# Patient Record
Sex: FEMALE | Race: WHITE | NOT HISPANIC OR LATINO | Employment: OTHER | URBAN - NONMETROPOLITAN AREA
[De-identification: names, ages, dates, MRNs, and addresses within clinical notes are randomized per-mention and may not be internally consistent; named-entity substitution may affect disease eponyms.]

---

## 2017-02-01 ENCOUNTER — TRANSCRIBE ORDERS (OUTPATIENT)
Dept: ADMINISTRATIVE | Facility: HOSPITAL | Age: 52
End: 2017-02-01

## 2017-02-01 DIAGNOSIS — G40.901 FEBRILE SEIZURE WITH STATUS EPILEPTICUS (HCC): Primary | ICD-10-CM

## 2017-02-14 ENCOUNTER — HOSPITAL ENCOUNTER (OUTPATIENT)
Dept: ULTRASOUND IMAGING | Facility: HOSPITAL | Age: 52
Discharge: HOME | End: 2017-02-14

## 2017-02-14 ENCOUNTER — HOSPITAL ENCOUNTER (OUTPATIENT)
Dept: MRI IMAGING | Facility: HOSPITAL | Age: 52
Discharge: HOME | End: 2017-02-14
Admitting: NURSE PRACTITIONER

## 2017-02-14 DIAGNOSIS — G40.901 FEBRILE SEIZURE WITH STATUS EPILEPTICUS (HCC): ICD-10-CM

## 2017-02-14 LAB — CREAT BLDA-MCNC: 1.1 MG/DL (ref 0.6–1.3)

## 2017-02-14 PROCEDURE — 93880 EXTRACRANIAL BILAT STUDY: CPT | Performed by: SURGERY

## 2017-02-14 PROCEDURE — 70553 MRI BRAIN STEM W/O & W/DYE: CPT

## 2017-02-14 PROCEDURE — 0 GADOBENATE DIMEGLUMINE 529 MG/ML SOLUTION: Performed by: NURSE PRACTITIONER

## 2017-02-14 PROCEDURE — 82565 ASSAY OF CREATININE: CPT

## 2017-02-14 PROCEDURE — A9577 INJ MULTIHANCE: HCPCS | Performed by: NURSE PRACTITIONER

## 2017-02-14 PROCEDURE — 93880 EXTRACRANIAL BILAT STUDY: CPT

## 2017-02-14 RX ADMIN — GADOBENATE DIMEGLUMINE 20 ML: 529 INJECTION, SOLUTION INTRAVENOUS at 12:30

## 2018-04-10 RX ORDER — ZONISAMIDE 100 MG/1
100 CAPSULE ORAL
COMMUNITY
End: 2018-07-12 | Stop reason: SDDI

## 2018-04-10 RX ORDER — ENALAPRIL MALEATE 20 MG/1
20 TABLET ORAL 2 TIMES DAILY
COMMUNITY

## 2018-04-10 RX ORDER — ESCITALOPRAM OXALATE 20 MG/1
20 TABLET ORAL DAILY
COMMUNITY
End: 2019-12-16 | Stop reason: ALTCHOICE

## 2018-04-10 RX ORDER — ERGOCALCIFEROL 1.25 MG/1
50000 CAPSULE ORAL WEEKLY
COMMUNITY
End: 2021-06-21 | Stop reason: SDUPTHER

## 2018-04-10 RX ORDER — ASPIRIN 325 MG
325 TABLET ORAL DAILY
COMMUNITY
End: 2018-07-12

## 2018-04-10 RX ORDER — TRIAMTERENE AND HYDROCHLOROTHIAZIDE 37.5; 25 MG/1; MG/1
1 CAPSULE ORAL EVERY MORNING
COMMUNITY
End: 2021-12-14

## 2018-04-10 RX ORDER — ATORVASTATIN CALCIUM 10 MG/1
10 TABLET, FILM COATED ORAL DAILY
COMMUNITY
End: 2018-07-12

## 2018-04-10 RX ORDER — AMLODIPINE BESYLATE 10 MG/1
10 TABLET ORAL DAILY
COMMUNITY

## 2018-04-10 RX ORDER — OMEPRAZOLE 40 MG/1
40 CAPSULE, DELAYED RELEASE ORAL DAILY
COMMUNITY

## 2018-06-07 ENCOUNTER — OFFICE VISIT (OUTPATIENT)
Dept: NEUROLOGY | Facility: CLINIC | Age: 53
End: 2018-06-07

## 2018-06-07 VITALS
WEIGHT: 253 LBS | SYSTOLIC BLOOD PRESSURE: 138 MMHG | HEART RATE: 78 BPM | RESPIRATION RATE: 18 BRPM | DIASTOLIC BLOOD PRESSURE: 80 MMHG | HEIGHT: 67 IN | BODY MASS INDEX: 39.71 KG/M2

## 2018-06-07 DIAGNOSIS — R41.3 MEMORY DIFFICULTY: Primary | ICD-10-CM

## 2018-06-07 DIAGNOSIS — R06.83 SNORING: ICD-10-CM

## 2018-06-07 PROCEDURE — 99204 OFFICE O/P NEW MOD 45 MIN: CPT | Performed by: PSYCHIATRY & NEUROLOGY

## 2018-06-07 RX ORDER — ASPIRIN 325 MG
325 TABLET ORAL DAILY
COMMUNITY

## 2018-06-07 RX ORDER — TRIAMTERENE AND HYDROCHLOROTHIAZIDE 37.5; 25 MG/1; MG/1
1 CAPSULE ORAL EVERY MORNING
COMMUNITY
End: 2018-07-12

## 2018-06-07 RX ORDER — OMEPRAZOLE 40 MG/1
40 CAPSULE, DELAYED RELEASE ORAL DAILY
COMMUNITY
End: 2018-07-12

## 2018-06-07 RX ORDER — CITALOPRAM 20 MG/1
20 TABLET ORAL DAILY
COMMUNITY
End: 2018-08-27 | Stop reason: ALTCHOICE

## 2018-06-07 RX ORDER — ENALAPRIL MALEATE 20 MG/1
20 TABLET ORAL 2 TIMES DAILY
COMMUNITY
End: 2018-07-12

## 2018-06-07 RX ORDER — ERGOCALCIFEROL 1.25 MG/1
50000 CAPSULE ORAL WEEKLY
COMMUNITY
End: 2018-07-12

## 2018-06-07 RX ORDER — ATORVASTATIN CALCIUM 40 MG/1
40 TABLET, FILM COATED ORAL DAILY
COMMUNITY

## 2018-06-07 RX ORDER — AMLODIPINE BESYLATE 2.5 MG/1
2.5 TABLET ORAL DAILY
COMMUNITY
End: 2018-07-12
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
## 2018-06-07 NOTE — PATIENT INSTRUCTIONS
Patient not to be driving.  Patient safety precautions: Patient not to be climbing or working at heights or working with sharp cutting tools or working over hot fires or water.  Patient to take showers not baths and not to be swimming by self or in hot tub by self.  Patient to get back with PCP about weight and diet control

## 2018-06-14 DIAGNOSIS — R06.83 SNORING: ICD-10-CM

## 2018-06-27 ENCOUNTER — HOSPITAL ENCOUNTER (OUTPATIENT)
Dept: SPEECH THERAPY | Facility: HOSPITAL | Age: 53
Setting detail: THERAPIES SERIES
Discharge: HOME | End: 2018-06-27

## 2018-06-27 ENCOUNTER — APPOINTMENT (OUTPATIENT)
Dept: SPEECH THERAPY | Facility: HOSPITAL | Age: 53
End: 2018-06-27

## 2018-06-27 ENCOUNTER — LAB (OUTPATIENT)
Dept: LAB | Facility: HOSPITAL | Age: 53
End: 2018-06-27
Attending: PSYCHIATRY & NEUROLOGY

## 2018-06-27 ENCOUNTER — HOSPITAL ENCOUNTER (OUTPATIENT)
Dept: NEUROLOGY | Facility: HOSPITAL | Age: 53
Discharge: HOME | End: 2018-06-27
Attending: PSYCHIATRY & NEUROLOGY | Admitting: PSYCHIATRY & NEUROLOGY

## 2018-06-27 DIAGNOSIS — R41.3 MEMORY DIFFICULTY: ICD-10-CM

## 2018-06-27 DIAGNOSIS — R41.3 MEMORY CHANGE: Primary | ICD-10-CM

## 2018-06-27 LAB
ALBUMIN SERPL-MCNC: 4.4 G/DL (ref 3.5–5)
ALBUMIN/GLOB SERPL: 1.3 G/DL (ref 1.1–2.5)
ALP SERPL-CCNC: 129 U/L (ref 24–120)
ALT SERPL W P-5'-P-CCNC: 28 U/L (ref 0–54)
ANION GAP SERPL CALCULATED.3IONS-SCNC: 13 MMOL/L (ref 4–13)
ARTICHOKE IGE QN: 127 MG/DL (ref 0–99)
AST SERPL-CCNC: 27 U/L (ref 7–45)
BASOPHILS # BLD AUTO: 0.04 10*3/MM3 (ref 0–0.2)
BASOPHILS NFR BLD AUTO: 0.3 % (ref 0–2)
BILIRUB SERPL-MCNC: 0.4 MG/DL (ref 0.1–1)
BUN BLD-MCNC: 18 MG/DL (ref 5–21)
BUN/CREAT SERPL: 17.1 (ref 7–25)
CALCIUM SPEC-SCNC: 10.3 MG/DL (ref 8.4–10.4)
CHLORIDE SERPL-SCNC: 103 MMOL/L (ref 98–110)
CHOLEST SERPL-MCNC: 224 MG/DL (ref 130–200)
CO2 SERPL-SCNC: 27 MMOL/L (ref 24–31)
CREAT BLD-MCNC: 1.05 MG/DL (ref 0.5–1.4)
DEPRECATED RDW RBC AUTO: 48 FL (ref 40–54)
EOSINOPHIL # BLD AUTO: 0.29 10*3/MM3 (ref 0–0.7)
EOSINOPHIL NFR BLD AUTO: 2.2 % (ref 0–4)
ERYTHROCYTE [DISTWIDTH] IN BLOOD BY AUTOMATED COUNT: 14.2 % (ref 12–15)
ERYTHROCYTE [SEDIMENTATION RATE] IN BLOOD: 19 MM/HR (ref 0–20)
FOLATE SERPL-MCNC: >20 NG/ML
GFR SERPL CREATININE-BSD FRML MDRD: 55 ML/MIN/1.73
GLOBULIN UR ELPH-MCNC: 3.4 GM/DL
GLUCOSE BLD-MCNC: 109 MG/DL (ref 70–100)
HCT VFR BLD AUTO: 41.4 % (ref 37–47)
HDLC SERPL-MCNC: 38 MG/DL
HGB BLD-MCNC: 13.4 G/DL (ref 12–16)
IMM GRANULOCYTES # BLD: 0.09 10*3/MM3 (ref 0–0.03)
IMM GRANULOCYTES NFR BLD: 0.7 % (ref 0–5)
LDLC/HDLC SERPL: 3.44 {RATIO}
LYMPHOCYTES # BLD AUTO: 2.7 10*3/MM3 (ref 0.72–4.86)
LYMPHOCYTES NFR BLD AUTO: 20.8 % (ref 15–45)
MAGNESIUM SERPL-MCNC: 1.8 MG/DL (ref 1.4–2.2)
MCH RBC QN AUTO: 29.9 PG (ref 28–32)
MCHC RBC AUTO-ENTMCNC: 32.4 G/DL (ref 33–36)
MCV RBC AUTO: 92.4 FL (ref 82–98)
MONOCYTES # BLD AUTO: 0.73 10*3/MM3 (ref 0.19–1.3)
MONOCYTES NFR BLD AUTO: 5.6 % (ref 4–12)
NEUTROPHILS # BLD AUTO: 9.1 10*3/MM3 (ref 1.87–8.4)
NEUTROPHILS NFR BLD AUTO: 70.4 % (ref 39–78)
NRBC BLD MANUAL-RTO: 0 /100 WBC (ref 0–0)
PLATELET # BLD AUTO: 302 10*3/MM3 (ref 130–400)
PMV BLD AUTO: 10.3 FL (ref 6–12)
POTASSIUM BLD-SCNC: 5.2 MMOL/L (ref 3.5–5.3)
PROT SERPL-MCNC: 7.8 G/DL (ref 6.3–8.7)
RBC # BLD AUTO: 4.48 10*6/MM3 (ref 4.2–5.4)
SODIUM BLD-SCNC: 143 MMOL/L (ref 135–145)
T4 FREE SERPL-MCNC: 1.12 NG/DL (ref 0.78–2.19)
TRIGL SERPL-MCNC: 277 MG/DL (ref 0–149)
VIT B12 BLD-MCNC: 309 PG/ML (ref 239–931)
WBC NRBC COR # BLD: 12.95 10*3/MM3 (ref 4.8–10.8)

## 2018-06-27 PROCEDURE — 84439 ASSAY OF FREE THYROXINE: CPT | Performed by: PSYCHIATRY & NEUROLOGY

## 2018-06-27 PROCEDURE — 95816 EEG AWAKE AND DROWSY: CPT | Performed by: PSYCHIATRY & NEUROLOGY

## 2018-06-27 PROCEDURE — G9169 MEMORY GOAL STATUS: HCPCS | Performed by: SPEECH-LANGUAGE PATHOLOGIST

## 2018-06-27 PROCEDURE — 82607 VITAMIN B-12: CPT | Performed by: PSYCHIATRY & NEUROLOGY

## 2018-06-27 PROCEDURE — 85651 RBC SED RATE NONAUTOMATED: CPT | Performed by: PSYCHIATRY & NEUROLOGY

## 2018-06-27 PROCEDURE — 95816 EEG AWAKE AND DROWSY: CPT

## 2018-06-27 PROCEDURE — 80061 LIPID PANEL: CPT | Performed by: PSYCHIATRY & NEUROLOGY

## 2018-06-27 PROCEDURE — 85025 COMPLETE CBC W/AUTO DIFF WBC: CPT | Performed by: PSYCHIATRY & NEUROLOGY

## 2018-06-27 PROCEDURE — 36415 COLL VENOUS BLD VENIPUNCTURE: CPT

## 2018-06-27 PROCEDURE — 96125 COGNITIVE TEST BY HC PRO: CPT | Performed by: SPEECH-LANGUAGE PATHOLOGIST

## 2018-06-27 PROCEDURE — G9168 MEMORY CURRENT STATUS: HCPCS | Performed by: SPEECH-LANGUAGE PATHOLOGIST

## 2018-06-27 PROCEDURE — 80053 COMPREHEN METABOLIC PANEL: CPT | Performed by: PSYCHIATRY & NEUROLOGY

## 2018-06-27 PROCEDURE — 83735 ASSAY OF MAGNESIUM: CPT | Performed by: PSYCHIATRY & NEUROLOGY

## 2018-06-27 PROCEDURE — 82746 ASSAY OF FOLIC ACID SERUM: CPT | Performed by: PSYCHIATRY & NEUROLOGY
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
## 2018-06-28 ENCOUNTER — HOSPITAL ENCOUNTER (OUTPATIENT)
Dept: MRI IMAGING | Facility: HOSPITAL | Age: 53
Discharge: HOME | End: 2018-06-28
Attending: PSYCHIATRY & NEUROLOGY | Admitting: PSYCHIATRY & NEUROLOGY

## 2018-06-28 ENCOUNTER — HOSPITAL ENCOUNTER (OUTPATIENT)
Dept: ULTRASOUND IMAGING | Facility: HOSPITAL | Age: 53
Discharge: HOME | End: 2018-06-28
Attending: PSYCHIATRY & NEUROLOGY

## 2018-06-28 DIAGNOSIS — R41.3 MEMORY DIFFICULTY: ICD-10-CM

## 2018-06-28 PROCEDURE — 70551 MRI BRAIN STEM W/O DYE: CPT

## 2018-06-28 PROCEDURE — 93880 EXTRACRANIAL BILAT STUDY: CPT | Performed by: SURGERY

## 2018-06-28 PROCEDURE — 93880 EXTRACRANIAL BILAT STUDY: CPT

## 2018-07-02 ENCOUNTER — APPOINTMENT (OUTPATIENT)
Dept: SPEECH THERAPY | Facility: HOSPITAL | Age: 53
End: 2018-07-02

## 2018-07-12 ENCOUNTER — OFFICE VISIT (OUTPATIENT)
Dept: NEUROLOGY | Facility: CLINIC | Age: 53
End: 2018-07-12

## 2018-07-12 VITALS
WEIGHT: 257 LBS | DIASTOLIC BLOOD PRESSURE: 80 MMHG | RESPIRATION RATE: 18 BRPM | HEIGHT: 67 IN | BODY MASS INDEX: 40.34 KG/M2 | SYSTOLIC BLOOD PRESSURE: 128 MMHG | HEART RATE: 78 BPM

## 2018-07-12 DIAGNOSIS — R90.89 ABNORMAL BRAIN MRI: ICD-10-CM

## 2018-07-12 DIAGNOSIS — R41.3 MEMORY DIFFICULTY: Primary | ICD-10-CM

## 2018-07-12 PROCEDURE — 99214 OFFICE O/P EST MOD 30 MIN: CPT | Performed by: PSYCHIATRY & NEUROLOGY

## 2018-07-12 RX ORDER — MEMANTINE HYDROCHLORIDE 5 MG/1
TABLET ORAL
Qty: 60 TABLET | Refills: 2 | Status: SHIPPED | OUTPATIENT
Start: 2018-07-12 | End: 2018-08-27

## 2018-07-12 NOTE — PROGRESS NOTES
Subjective   Jammie Mims, 1965, is a female who is being seen today for   Chief Complaint   Patient presents with   • Memory Loss   • Stroke       HISTORY OF PRESENT ILLNESS: Extended follow-up.  Patient MMSE today is 29 of 30.  Patient has had formal memory test that shows 40-60% impairment.  Patient MRI brain shows no significant acute changes but chronic bilateral cerebellar infarcts.  Patient's MRA brain was denied.  Patient's overnight continues oximetry showed no major abnormalities.  Patient's carotid ultrasound shows less than 50% stenosis internal carotids and antegrade vertebrals.  There is greater than 50% stenosis of the right external carotid artery to the face.  EEG was noncontributory.    REVIEW OF SYSTEMS:   GENERAL: As above  PULMONARY: As above  CVS:  No acute chest pain or palpitation  GASTROINTESTINAL: No acute GI distress  GENITOURINARY: No acute  distress  GYN: Status post hysterectomy  MUSCULOSKELETAL: No acute musculoskeletal symptoms  HEENT:  No acute vision or hearing change.  Patient denies any significant headaches  ENDOCRINE:  No acute endocrine symptoms  PSYCHIATRIC: No acute psychiatric symptoms  HEMATOLOGY: As above  SKIN: No acute changes  Family history positive for dementia in her father  Social history: Patient does smoke.  Patient denies alcohol or drug use    PHYSICAL EXAMINATION:    GENERAL: No acute distress  CRANIUM: Normocephalic/atraumatic  HEENT: No acute fundic abnormalities.  Pupils equal round reactive to light.       EYES: EOMs intact without nystagmus and fields full to confrontation       EARS:  Tympanic membranes normal hears tuning fork bilaterally       THROAT: No oropharynx abnormalities       NECK:  No bruits/no lymphadenopathy  CHEST: No acute cardiopulmonary abnormalities by auscultation  ABDOMEN: Nondistended  EXTREMITIES: Pulses symmetrical  NEURO: Patient alert and follows commands without difficulty  SPEECH:  Normal    CRANIAL NERVES:   Motor sensory about the face normal and symmetric    MOTOR STRENGTH:  Motor strength upper and lower extremities normal  STATION AND GAIT:  Gait normal/Romberg negative  CEREBELLAR:  Finger-nose and heel shin normal  SENSORY:  Pin and vibration upper and lower extremities normal    REFLEXES:  Reflexes slightly hyperactive upper and lower extremities without Babinski's or clonus      ASSESSMENT AND PLAN:  Patient with memory difficulties as above and abnormal brain MRI.  We are going to try again to see of we get the MRA brain approved.  Her setting up speech therapy locally and [facility] for follow-up of her memory and starting the patient on Namenda 5 mg by mouth daily warning in detail of side effects.  Patient is to increase to 5 mg by mouth twice a day as tolerated in 2 weeks.  Patient not to be driving and safety precautions reviewed.Patient's Body mass index is 40.25 kg/m². BMI is above normal parameters. Recommendations include: referral to primary care.  I spent 25 minutes with this patient with 15 minutes counseling.      Jammie was seen today for memory loss and stroke.    Diagnoses and all orders for this visit:    Memory difficulty  -     Ambulatory Referral to Speech Therapy    Abnormal brain MRI  -     MRI Angiogram Head Without Contrast; Future    Other orders  -     memantine (NAMENDA) 5 MG tablet; One by mouth daily ×2 weeks and if tolerated increase to 1 by mouth twice a day

## 2018-07-12 NOTE — PATIENT INSTRUCTIONS
Patient not to be driving until released.  Patient not to be climbing or working at heights or working over hot fires or water or with sharp cutting tools.  Patient to get with PCP about weight and diet control and elevated cholesterol/triglycerides and elevated white blood cell count.  Patient to get with PCP on weight and diet control

## 2018-07-18 RX ORDER — DONEPEZIL HYDROCHLORIDE 5 MG/1
5 TABLET, FILM COATED ORAL NIGHTLY
Qty: 90 TABLET | Refills: 0 | Status: SHIPPED | OUTPATIENT
Start: 2018-07-18 | End: 2018-10-19 | Stop reason: SINTOL

## 2018-07-20 ENCOUNTER — DOCUMENTATION (OUTPATIENT)
Dept: NEUROLOGY | Facility: CLINIC | Age: 53
End: 2018-07-20

## 2018-07-20 ENCOUNTER — TELEPHONE (OUTPATIENT)
Dept: NEUROLOGY | Facility: CLINIC | Age: 53
End: 2018-07-20

## 2018-07-20 NOTE — PROGRESS NOTES
I have left a message for patient to contact our office in regards to the Aricept.  I have notified the phone nurse of Dr. Barry orders and she is to speak with patient about his instructions.

## 2018-07-20 NOTE — TELEPHONE ENCOUNTER
Jammie notified Dr. Barry wants her to talk to her PCP about discontinuing the Citalopram.  She said she will try to contact her today, she is also to ask her to contact Dr. Barry.  Aricept has been discontinued due to an interaction between the two medications.

## 2018-07-25 ENCOUNTER — DOCUMENTATION (OUTPATIENT)
Dept: NEUROLOGY | Facility: CLINIC | Age: 53
End: 2018-07-25

## 2018-07-25 NOTE — PROGRESS NOTES
I contacted Carmelita Matthews nurse practitioner at [phone] and she is going to discuss taking the patient off of citalopram and starting the Aricept.  If the patient tolerates discontinuing citalopram she is to start Aricept 5 mg by mouth daily again warning of potential side effects

## 2018-07-26 ENCOUNTER — HOSPITAL ENCOUNTER (OUTPATIENT)
Dept: MRI IMAGING | Facility: HOSPITAL | Age: 53
Discharge: HOME | End: 2018-07-26
Attending: PSYCHIATRY & NEUROLOGY | Admitting: PSYCHIATRY & NEUROLOGY

## 2018-07-26 DIAGNOSIS — R90.89 ABNORMAL BRAIN MRI: ICD-10-CM

## 2018-07-26 PROCEDURE — 70544 MR ANGIOGRAPHY HEAD W/O DYE: CPT

## 2018-08-27 ENCOUNTER — OFFICE VISIT (OUTPATIENT)
Dept: NEUROLOGY | Facility: CLINIC | Age: 53
End: 2018-08-27

## 2018-08-27 VITALS
HEART RATE: 74 BPM | WEIGHT: 251 LBS | HEIGHT: 67 IN | RESPIRATION RATE: 18 BRPM | DIASTOLIC BLOOD PRESSURE: 80 MMHG | SYSTOLIC BLOOD PRESSURE: 140 MMHG | BODY MASS INDEX: 39.39 KG/M2

## 2018-08-27 DIAGNOSIS — R41.3 MEMORY DIFFICULTY: Primary | ICD-10-CM

## 2018-08-27 PROCEDURE — 99213 OFFICE O/P EST LOW 20 MIN: CPT | Performed by: PSYCHIATRY & NEUROLOGY

## 2018-08-27 NOTE — PATIENT INSTRUCTIONS
Patient to not drive for at least the first week after starting the Aricept.  If there are problems with the Aricept we are to be contacted immediately.  Patient to get with PCP about weight and diet control

## 2018-08-27 NOTE — PROGRESS NOTES
Subjective   Jammie Mims, 1965, is a female who is being seen today for   Chief Complaint   Patient presents with   • Memory Loss   • Stroke       HISTORY OF PRESENT ILLNESS: Patient seen for memory difficulty.  MMSE today is 30 of 30.  Patient now off the Celexa and can start the Aricept low dosage at 5 mg by mouth daily.  Side effects are discussed.  Patient is to not be driving for the at least the first week on the Aricept to make sure it does not cause side effects.  Patient could not afford the Namenda.  If there are side effects they are to let us know immediately.  Patient's MRA brain showed no abnormalities.  Patient to get outpatient follow-up for her memory difficulties.    REVIEW OF SYSTEMS:   GENERAL: As above  PULMONARY: No acute respiratory difficulties  CVS:  No acute chest pain or palpitation  GASTROINTESTINAL: No acute GI distress  GENITOURINARY: No acute  distress  GYN: Post hysterectomy  MUSCULOSKELETAL: No acute musculoskeletal symptoms  HEENT:  No acute vision or hearing change  ENDOCRINE:  No acute endocrine symptoms  PSYCHIATRIC: No acute psychiatric symptoms  HEMATOLOGY: No anemia.  Patient had slight elevation of white blood cell count is to get repeat CBC  SKIN: No acute skin changes  Family history reviewed and has history of dementia  Social history: Patient does smoke.  Patient denies alcohol or drug use    PHYSICAL EXAMINATION:    GENERAL: Patient blood pressure 140/80 sitting, 140/76 standing left arm  CRANIUM: Normocephalic/atraumatic/atraumatic  HEENT: No acute fundic abnormalities.  Pupils equal round reactive to light.       EYES: EOMs intact without nystagmus and fields full to confrontation       EARS:  Tympanic membranes normal hears tuning fork bilaterally       THROAT: No oropharynx abnormalities       NECK:  No bruits/no lymphadenopathy  CHEST: No acute cardiopulmonary abnormalities by auscultation  ABDOMEN: Nondistended  EXTREMITIES: Pulses  symmetrical  NEURO: Patient alert and follows commands without difficulty  SPEECH:  Normal    CRANIAL NERVES:  Motor sensory about the face normal and symmetric    MOTOR STRENGTH:  Motor strength upper and lower extremities normal  STATION AND GAIT:  Gait normal/Romberg negative  CEREBELLAR:  Finger-nose and heel shin normal  SENSORY:  Pin and vibration upper and lower extremities normal  REFLEXES:  Reflexes slightly hyperactive upper and lower extremity without Babinski's or clonus      ASSESSMENT AND PLAN:  Patient with memory difficulties.  To start Aricept. Patient's Body mass index is 39.31 kg/m². BMI is above normal parameters. Recommendations include: referral to primary care.      Jammie was seen today for memory loss and stroke.    Diagnoses and all orders for this visit:    Memory difficulty

## 2018-10-18 ENCOUNTER — TELEPHONE (OUTPATIENT)
Dept: NEUROLOGY | Facility: CLINIC | Age: 53
End: 2018-10-18

## 2018-10-18 NOTE — TELEPHONE ENCOUNTER
Jammie notified Dr. Barry wants her to contact her PCP about taking Celexa again.  She said she isn't taking Aricept and she didn't know Lexapro had been prescribed for her.  She said she has some Celexa left and wanted to know if she could start taking it again.  I told her she needs to contact her PCP and ask if she can start taking the Celexa.  She said her PCP can see her on Tuesday, but she will call and ask if she can restart the Celexa.

## 2018-10-19 RX ORDER — MEMANTINE HYDROCHLORIDE 5 MG/1
5 TABLET ORAL DAILY
Qty: 90 TABLET | Refills: 0 | Status: SHIPPED | OUTPATIENT
Start: 2018-10-19 | End: 2018-11-20 | Stop reason: SDUPTHER

## 2018-10-30 DIAGNOSIS — R41.3 MEMORY DIFFICULTY: ICD-10-CM

## 2018-11-02 ENCOUNTER — OFFICE VISIT (OUTPATIENT)
Dept: NEUROLOGY | Facility: CLINIC | Age: 53
End: 2018-11-02

## 2018-11-02 VITALS
HEIGHT: 67 IN | HEART RATE: 74 BPM | SYSTOLIC BLOOD PRESSURE: 120 MMHG | WEIGHT: 253 LBS | DIASTOLIC BLOOD PRESSURE: 80 MMHG | BODY MASS INDEX: 39.71 KG/M2 | RESPIRATION RATE: 18 BRPM

## 2018-11-02 DIAGNOSIS — R41.3 MEMORY DIFFICULTY: Primary | ICD-10-CM

## 2018-11-02 PROCEDURE — 99213 OFFICE O/P EST LOW 20 MIN: CPT | Performed by: PSYCHIATRY & NEUROLOGY

## 2018-11-02 NOTE — PATIENT INSTRUCTIONS
Patient/family are to contact our office in 2 weeks to let us know how she is doing on the low-dose Namenda and if stable may be able to increase doses to 5 mg by mouth twice a day.  Patient to not be driving for at least 5 days after increasing the dose/ to get with PCP about weight and diet control

## 2018-11-02 NOTE — PROGRESS NOTES
Subjective   Jammie Mims, 1965, is a female who is being seen today for   Chief Complaint   Patient presents with   • Memory Loss       HISTORY OF PRESENT ILLNESS: Patient seen for memory difficulty.  Patient had significant side effects from Aricept and discontinued that.  Patient now on Namenda 5 mg by mouth daily which she is tolerating well.  She has been on for 5 days.  Blood pressure today is 120/80 left arm sitting and 130/86 left arm standing.  Pulse 74.  REVIEW OF SYSTEMS:   GENERAL: MMSE is 30 of 30.  PULMONARY: No acute respiratory difficulties  CVS:  No chest pain or palpitation  GASTROINTESTINAL: No GI distress at this time  GENITOURINARY: No  distress  GYN: Post hysterectomy  MUSCULOSKELETAL: No acute musculoskeletal symptoms  HEENT:  No acute vision or hearing change  ENDOCRINE:  No acute endocrine symptoms  PSYCHIATRIC: No psychiatric symptoms at this time  HEMATOLOGY: No anemia  SKIN: No skin changes  Family history reviewed and otherwise noncontributory except for history of dementia  Social history: Patient does smoke.  Patient denies alcohol or drug use    PHYSICAL EXAMINATION:    GENERAL: No acute distress  CRANIUM: Normocephalic/atraumatic  HEENT: No acute fundic abnormalities.  Pupils equal round reactive to light.       EYES: EOMs intact without nystagmus and fields full to confrontation       EARS:  Tympanic membranes normal hears tuning fork bilaterally       THROAT: No oropharynx abnormalities       NECK:  No bruits/no lymphadenopathy  CHEST: No acute cardiopulmonary abnormalities by auscultation  ABDOMEN: Nondistended  EXTREMITIES: Pulses symmetrical  NEURO: Patient alert and follows commands without difficulty  SPEECH:  Normal    CRANIAL NERVES:  Motor sensory about the face normal and symmetric    MOTOR STRENGTH:  Motor strength upper and lower extremities normal  STATION AND GAIT:  Gait normal/Romberg negative  CEREBELLAR:  Finger-nose and heel shin  normal  SENSORY:  Pin and vibration upper and lower extremities normal  REFLEXES:  Reflexes slightly hyperactive upper and lower extremity without Babinski's or clonus      ASSESSMENT AND PLAN:  Patient with memory difficulties continuing Namenda and increasing dosage in 2 weeks if tolerated.  Patient not to be driving after increasing dosage. Patient's Body mass index is 39.63 kg/m². BMI is above normal parameters. Recommendations include: referral to primary care.      Jammie was seen today for memory loss.    Diagnoses and all orders for this visit:    Memory difficulty

## 2018-11-20 RX ORDER — MEMANTINE HYDROCHLORIDE 5 MG/1
5 TABLET ORAL 2 TIMES DAILY
Qty: 60 TABLET | Refills: 0 | Status: SHIPPED | OUTPATIENT
Start: 2018-11-20 | End: 2019-01-23 | Stop reason: SDUPTHER

## 2018-11-28 ENCOUNTER — TELEPHONE (OUTPATIENT)
Dept: NEUROLOGY | Facility: CLINIC | Age: 53
End: 2018-11-28

## 2018-11-28 NOTE — TELEPHONE ENCOUNTER
Jammie said the [facility]  She is going to contact [facility] and ask if she can return the medication.  [facility]

## 2018-12-10 ENCOUNTER — TELEPHONE (OUTPATIENT)
Dept: NEUROLOGY | Facility: CLINIC | Age: 53
End: 2018-12-10

## 2018-12-10 NOTE — TELEPHONE ENCOUNTER
I left a message for this patient to contact us/ she was to call our office back /that it was okay for her to go back on low-dose Celexa and I did speak as well with her nurse practitioner Carmelita Matthews.  The nurse practitioner agreed to contact the patient about this.

## 2018-12-10 NOTE — TELEPHONE ENCOUNTER
Jammie notified that she could restart Celexa.  She said her PCP office had called her and restarted the Celexa for her.

## 2019-01-23 RX ORDER — MEMANTINE HYDROCHLORIDE 5 MG/1
5 TABLET ORAL 2 TIMES DAILY
Qty: 60 TABLET | Refills: 5 | Status: SHIPPED | OUTPATIENT
Start: 2019-01-23 | End: 2019-05-07 | Stop reason: SDUPTHER

## 2019-04-04 ENCOUNTER — TELEPHONE (OUTPATIENT)
Dept: NEUROLOGY | Facility: CLINIC | Age: 54
End: 2019-04-04

## 2019-04-05 ENCOUNTER — OFFICE VISIT (OUTPATIENT)
Dept: NEUROLOGY | Facility: CLINIC | Age: 54
End: 2019-04-05

## 2019-04-05 VITALS
HEIGHT: 67 IN | BODY MASS INDEX: 39.71 KG/M2 | SYSTOLIC BLOOD PRESSURE: 126 MMHG | HEART RATE: 78 BPM | WEIGHT: 253 LBS | DIASTOLIC BLOOD PRESSURE: 70 MMHG

## 2019-04-05 DIAGNOSIS — R41.3 MEMORY DIFFICULTY: Primary | ICD-10-CM

## 2019-04-05 PROCEDURE — 99214 OFFICE O/P EST MOD 30 MIN: CPT | Performed by: PSYCHIATRY & NEUROLOGY

## 2019-04-05 NOTE — PROGRESS NOTES
Subjective   Jammie Mims, 1965, is a female who is being seen today for   Chief Complaint   Patient presents with   • Memory Loss     Patient states she has no change since last visit but feels really good. On a weight loss program.        HISTORY OF PRESENT ILLNESS: Extended follow-up.  Patient has tolerated the 5 mg 1 p.o. twice daily without difficulty.  She has been on the increase in Namenda at least 4 weeks.  With her history of possible stroke and seizures in the past I discussed with the patient's  to increase the dosage if her blood work is stable / CBC and CMP early next week.  Patient would increase the dosage to 1 p.o. every morning and 2 p.o. nightly for at least 2-3 weeks before increasing it to our target dosage of 10 mg p.o. twice daily if blood work is stable and no evidence of kidney dysfunction.  Patient to be seen in emergency room immediately if side effects occur from the increased dosage.  Patient is unsure of the antidepressant medication she is on whether it is Lexapro or Celexa.  Patient family to call us back with that information.  MMSE today is 29 of 30.    REVIEW OF SYSTEMS:   GENERAL: Blood pressure today is 126/70 seated and 134/80 standing.  Pulse is 78 and regular  PULMONARY: No acute respiratory difficulties  CVS: No acute chest pain or palpitation  GASTROINTESTINAL: No acute GI distress  GENITOURINARY: No acute  distress  GYN: No acute GYN distress  MUSCULOSKELETAL: No acute musculoskeletal symptoms  HEENT: No acute vision or hearing change  ENDOCRINE: No acute endocrine symptoms  PSYCHIATRIC: No acute psychiatric symptoms  HEMATOLOGY: No anemia  SKIN: No skin changes  Family history reviewed and otherwise noncontributory except for history of dementia  Social history: Patient does smoke.  Patient denies alcohol use or drug use      PHYSICAL EXAMINATION:    GENERAL: No acute distress  CRANIUM: Normocephalic/atraumatic  HEENT: No acute fundic  abnormalities.  Pupils equal round reactive to light.       EYES: EOMs intact without nystagmus and fields full to confrontation       EARS: Tympanic membranes normal and hears tuning fork bilaterally       THROAT: No oropharynx abnormalities       NECK: No bruit/no lymphadenopathy  CHEST: No acute cardiopulmonary myalgias by auscultation  ABDOMEN: Nondistended  EXTREMITIES: Pulses symmetrical  NEURO: Patient alert and follows commands without difficulty  SPEECH: Normal  CRANIAL NERVES: Motor sensory about the face normal and symmetric    MOTOR STRENGTH: Motor strength upper and lower extremities normal  STATION AND GAIT: Gait normal/Romberg negative  CEREBELLAR: Finger-nose and heel shin normal  SENSORY: Patient has slight decrease in pin and vibration distal proximal lower extremities to midfoot bilaterally otherwise normal throughout.  REFLEXES: Slight hyperactive reflexes at the knee jerks and biceps bilaterally slight decrease in ankle jerks versus knee jerks.  No Babinski or clonus      ASSESSMENT AND PLAN: Patient stable with her memory loss.  Plan is to try to increase the dosage of the Namenda as above watching for side effects.  Patient to get appropriate blood work before increasing the dosage.Patient's Body mass index is 39.63 kg/m². BMI is above normal parameters. Recommendations include: referral to primary care.  I spent 25 minutes with this patient with 15 minutes counseling.      Jammie was seen today for memory loss.    Diagnoses and all orders for this visit:    Memory difficulty  -     CBC & Differential; Future  -     Comprehensive Metabolic Panel; Future        Dictated utilizing Dragon voice recognition software

## 2019-04-05 NOTE — PATIENT INSTRUCTIONS
Patient to get with PCP about weight and diet control.  Patient to increase slowly the Namenda to 5 mg 1 p.o. every morning and 2 p.o. nightly for next 2-3 weeks and if tolerated go ahead to 10 mg p.o. twice daily.  Patient family to contact us when at 5 mg 2 p.o. twice daily if tolerated so the next prescription can be on Namenda 10 mg 1 p.o. twice daily.  Patient to get to emergency room immediately if side effects of the Namenda.

## 2019-04-16 DIAGNOSIS — R41.3 MEMORY DIFFICULTY: ICD-10-CM

## 2019-05-07 RX ORDER — MEMANTINE HYDROCHLORIDE 5 MG/1
TABLET ORAL
Qty: 90 TABLET | Refills: 1 | Status: SHIPPED | OUTPATIENT
Start: 2019-05-07 | End: 2019-06-14 | Stop reason: SDUPTHER

## 2019-05-08 ENCOUNTER — DOCUMENTATION (OUTPATIENT)
Dept: SPEECH THERAPY | Facility: HOSPITAL | Age: 54
End: 2019-05-08

## 2019-05-08 DIAGNOSIS — R41.3 MEMORY CHANGE: Primary | ICD-10-CM

## 2019-05-08 NOTE — THERAPY DISCHARGE NOTE
Speech Language Pathology Discharge Summary         Patient Name: Jammie Mims  : 1965  MRN: [id number]    Today's Date: 2019    Documentation for discharge only today. Patient declined to schedule therapy and did not call back to schedule.       OP SLP Discharge Summary  Date of Discharge: 19  Reason for Discharge: patient/family request discontinuation of services  Progress Toward Achieving Short/long Term Goals: other (see comments)(Patient did not schedule therapy. )  Discharge Destination: unknown  Discharge Instructions: Follow up with MD      Thank you for this referral.       Myrna Harper CCC-SLP  2019

## 2019-06-14 ENCOUNTER — OFFICE VISIT (OUTPATIENT)
Dept: NEUROLOGY | Facility: CLINIC | Age: 54
End: 2019-06-14

## 2019-06-14 VITALS
BODY MASS INDEX: 39.08 KG/M2 | SYSTOLIC BLOOD PRESSURE: 132 MMHG | RESPIRATION RATE: 18 BRPM | WEIGHT: 249 LBS | HEART RATE: 74 BPM | HEIGHT: 67 IN | DIASTOLIC BLOOD PRESSURE: 80 MMHG

## 2019-06-14 DIAGNOSIS — R41.3 MEMORY DIFFICULTY: Primary | ICD-10-CM

## 2019-06-14 PROCEDURE — 99213 OFFICE O/P EST LOW 20 MIN: CPT | Performed by: PSYCHIATRY & NEUROLOGY

## 2019-06-14 RX ORDER — CITALOPRAM 20 MG/1
20 TABLET ORAL DAILY
COMMUNITY

## 2019-06-14 RX ORDER — MEMANTINE HYDROCHLORIDE 5 MG/1
TABLET ORAL
Qty: 90 TABLET | Refills: 1 | Status: SHIPPED | OUTPATIENT
Start: 2019-06-14 | End: 2019-09-27 | Stop reason: SDUPTHER

## 2019-06-14 NOTE — PROGRESS NOTES
"Subjective   Jammie Mims, 1965, is a female who is being seen today for   Chief Complaint   Patient presents with   • Memory Loss       HISTORY OF PRESENT ILLNESS: Patient seen for memory difficulty.  Patient could not tolerate the Namenda 5 mg in the morning and 10 mg at night and is gone back to 10 mg p.o. nightly.  Patient says she is doing very well on that with stable memory.  She really does not want to try to increase the dosage again.  Patient's MMSE today is 27-30.  Patient had trouble with Aricept before.  Patient says she drives \"when she feels well\" and has not had any problem with that.  Patient says she gets out in the yard regularly and does what she wants to do.    REVIEW OF SYSTEMS:   GENERAL: Blood pressure today 132/80 left arm seated with pulse 74  PULMONARY: No acute respiratory difficulty  CVS: No acute chest pain or palpitation  GASTROINTESTINAL: Patient nauseated with the extra dose of the Namenda and better since off that  GENITOURINARY: No acute  distress  GYN: No acute GYN distress  MUSCULOSKELETAL: No acute musculoskeletal symptoms  HEENT: No vision or hearing change  ENDOCRINE: No acute endocrine symptoms  PSYCHIATRIC: Patient says that she is on Celexa from her PCP and \"does not want anything dirty on her\"  HEMATOLOGY: Patient's last CBC reviewed and showed borderline elevation of white blood cell count but no anemia  SKIN: No acute skin changes  Family history reviewed and otherwise noncontributory  Social history: Patient does smoke.  Patient denies alcohol use or drug use      PHYSICAL EXAMINATION:    GENERAL: No acute distress  CRANIUM: Normocephalic/atraumatic  HEENT: No acute fundic abnormalities.  Pupils equal round reactive to light.       EYES: EOMs intact without nystagmus and fields full to confrontation       EARS: TM normal bilaterally and hears tuning fork bilaterally       THROAT: No oropharynx abnormalities       NECK: No bruit/no " lymphadenopathy  CHEST: No acute cardiopulmonary normalities by auscultation  ABDOMEN: Nondistended  EXTREMITIES: Pulses symmetrical.  NEURO: Patient alert and follows commands without difficulty  SPEECH: Normal    CRANIAL NERVES: Motor/sensory about the face normal and symmetric    MOTOR STRENGTH: Motor strength and tone upper and lower extremities normal  STATION AND GAIT: Gait normal/Romberg negative  CEREBELLAR: Finger-nose and heel-to-shin normal  SENSORY: Pin and vibration upper and lower extremities show slight decrease in pin and vibration distal proximal lower extremities to the midfoot bilaterally otherwise normal throughout  REFLEXES: Slightly hyperactive reflex the knee jerks and biceps bilaterally but otherwise decreased at the ankle jerk versus knee jerk.  No Babinski or clonus      ASSESSMENT AND PLAN: Patient with memory difficulties stable as above.Patient's Body mass index is 39 kg/m². BMI is above normal parameters. Recommendations include: referral to primary care.      Jammie was seen today for memory loss.    Diagnoses and all orders for this visit:    Memory difficulty    Other orders  -     memantine (NAMENDA) 5 MG tablet; Take 2 tabs po at bedtime  -     Cancel: CBC & Differential; Future  -     Cancel: Comprehensive Metabolic Panel; Future        Dictated utilizing Dragon voice recognition software

## 2019-09-27 RX ORDER — MEMANTINE HYDROCHLORIDE 5 MG/1
TABLET ORAL
Qty: 60 TABLET | Refills: 2 | Status: SHIPPED | OUTPATIENT
Start: 2019-09-27 | End: 2019-12-16 | Stop reason: SDUPTHER

## 2019-12-16 ENCOUNTER — OFFICE VISIT (OUTPATIENT)
Dept: NEUROLOGY | Facility: CLINIC | Age: 54
End: 2019-12-16

## 2019-12-16 VITALS
HEART RATE: 73 BPM | SYSTOLIC BLOOD PRESSURE: 138 MMHG | OXYGEN SATURATION: 97 % | HEIGHT: 67 IN | BODY MASS INDEX: 38.92 KG/M2 | WEIGHT: 248 LBS | DIASTOLIC BLOOD PRESSURE: 78 MMHG

## 2019-12-16 DIAGNOSIS — R41.3 MEMORY DIFFICULTY: Primary | ICD-10-CM

## 2019-12-16 PROCEDURE — 99214 OFFICE O/P EST MOD 30 MIN: CPT | Performed by: PSYCHIATRY & NEUROLOGY

## 2019-12-16 RX ORDER — MEMANTINE HYDROCHLORIDE 5 MG/1
TABLET ORAL
Qty: 180 TABLET | Refills: 1 | Status: SHIPPED | OUTPATIENT
Start: 2019-12-16 | End: 2020-06-15 | Stop reason: SDUPTHER

## 2019-12-16 NOTE — PROGRESS NOTES
Subjective   Jammie Mims, 1965, is a female who is being seen today for   Chief Complaint   Patient presents with   • Memory Loss       HISTORY OF PRESENT ILLNESS: Extended follow-up.  Patient's MMSE today is 26 of 30 (27 of 30 last time).  Patient continues the Namenda 10 mg nightly as she has not been able to tolerate a higher dose and was not able to tolerate Aricept.  Patient had recent left elbow surgery and left toenail surgery.  Patient would not take off her left sock for the examination.      REVIEW OF SYSTEMS:   GENERAL: Blood pressure 138/78 right arm sitting and same standing.  Pulse of 74.    PULMONARY: No acute respiratory difficulty  CVS: No acute chest pain or palpitation  GASTROINTESTINAL: No acute GI distress  GENITOURINARY: No acute  distress  GYN: No acute GYN distress  MUSCULOSKELETAL: As above  HEENT: No acute vision or hearing change  ENDOCRINE: No acute endocrine symptoms  PSYCHIATRIC: Patient on Celexa now  HEMATOLOGY: No recent blood work for review.  Patient's blood work followed by PCP  SKIN: As above  Family history reviewed and otherwise noncontributory  Social history: Patient does smoke.  Patient denies alcohol or drug use      PHYSICAL EXAMINATION:    GENERAL: No acute distress  CRANIUM: Normocephalic/atraumatic  HEENT:       EYES: EOMs intact without nystagmus.  Fields full to confrontation.  Pupils equal round reactive to light.  No acute fundic abnormalities.       EARS: Tympanic membranes normal and hears tuning fork bilaterally       THROAT: No oropharynx abnormalities       NECK: No bruits/no lymphadenopathy  CHEST: No acute cardiopulmonary abnormalities by auscultation  ABDOMEN: Nondistended  EXTREMITIES: Pulses symmetrical dorsalis pedis  NEURO: Patient alert and follows commands without difficulty  SPEECH: Normal    CRANIAL NERVES: Motor/sensory about the face normal and symmetric    MOTOR STRENGTH: Motor strength upper and lower extremities  normal  STATION AND GAIT: Gait normal/Romberg negative  CEREBELLAR: Finger-nose and heel-to-shin normal  SENSORY: Normal pin and vibration upper and lower extremities  REFLEXES: Reflexes mildly hyperactive upper and lower extremity without clonus or Babinski      ASSESSMENT AND PLAN:  Patient with memory difficulty as above.  And patient to continue same dose of Namenda for now.  I spent 25 minutes with this patient with 15 minutes counseling.Patient's Body mass index is 38.84 kg/m². BMI is above normal parameters. Recommendations include: referral to primary care.    Jammie was seen today for memory loss.    Diagnoses and all orders for this visit:    Memory difficulty    Other orders  -     memantine (NAMENDA) 5 MG tablet; TAKE 2 TABLETS BY MOUTH ONCE DAILY AT BEDTIME        Dictated utilizing Dragon voice recognition software

## 2019-12-16 NOTE — PATIENT INSTRUCTIONS
Patient to continue safety precautions and driving precautions as previously discussed.  Patient to get with PCP about weight and diet control

## 2020-03-23 ENCOUNTER — TELEPHONE (OUTPATIENT)
Dept: NEUROLOGY | Facility: CLINIC | Age: 55
End: 2020-03-23

## 2020-03-23 NOTE — TELEPHONE ENCOUNTER
Jammie said she filled the Namenda a few days ago.  I explained that she needed to wait until the end of May to request a refill, it's too early to refill it at this time.

## 2020-03-23 NOTE — TELEPHONE ENCOUNTER
Patient is requesting a new RX be sent to her pharmacy due to the pharmacy telling her she had 0 refills left on her memantine (NAMENDA) 5 MG tablet

## 2020-06-15 ENCOUNTER — OFFICE VISIT (OUTPATIENT)
Dept: NEUROLOGY | Facility: CLINIC | Age: 55
End: 2020-06-15

## 2020-06-15 VITALS
DIASTOLIC BLOOD PRESSURE: 80 MMHG | BODY MASS INDEX: 40.34 KG/M2 | RESPIRATION RATE: 18 BRPM | HEIGHT: 67 IN | WEIGHT: 257 LBS | HEART RATE: 74 BPM | SYSTOLIC BLOOD PRESSURE: 132 MMHG

## 2020-06-15 DIAGNOSIS — R41.3 MEMORY DIFFICULTY: Primary | ICD-10-CM

## 2020-06-15 PROCEDURE — 99214 OFFICE O/P EST MOD 30 MIN: CPT | Performed by: PSYCHIATRY & NEUROLOGY

## 2020-06-15 RX ORDER — MEMANTINE HYDROCHLORIDE 5 MG/1
TABLET ORAL
Qty: 180 TABLET | Refills: 1 | Status: SHIPPED | OUTPATIENT
Start: 2020-06-15 | End: 2020-12-11 | Stop reason: SDUPTHER

## 2020-06-15 NOTE — PATIENT INSTRUCTIONS
Patient had driving precautions and safety precautions with no climbing and no use of sharp cutting tools and cautions of working around hot stove/fire/grill/water.  Patient get with PCP about weight and diet control such as on that other lady signed external things again as we need to give her she is already

## 2020-06-15 NOTE — PROGRESS NOTES
Subjective   Jammie Mims, 1965, is a female who is being seen today for   Chief Complaint   Patient presents with   • Memory Loss   • Stroke   • Seizures       HISTORY OF PRESENT ILLNESS: Extended follow-up.  Patient not had any stroke symptoms or seizure and patient stable on 5 mg 2 p.o. nightly the Namenda.  Patient could not tolerate increased dose with nausea.  She is stable with her memory with MMSE at 28 of 30.  Patient has been trying to ride a bicycle but has had trouble with balance  REVIEW OF SYSTEMS:   GENERAL: Blood pressure 130/80 sitting and standing pulse 74  PULMONARY: No acute respiratory difficulties  CVS: No acute chest pain or palpitation  GASTROINTESTINAL: No acute GI distress  GENITOURINARY: No acute  distress  GYN: No acute GYN distress  MUSCULOSKELETAL: Patient bruised her left knee falling over a bale of hay a week ago and has some areas over the patella but no significant swelling  HEENT: No acute vision or hearing change  ENDOCRINE: Not diabetic  PSYCHIATRIC: No acute psychiatric symptoms  HEMATOLOGY: No recent blood work for review  SKIN: As above   family history reviewed and otherwise noncontributory to this problem    Social history: Patient does smoke.  Patient denies alcohol or drug use  PHYSICAL EXAMINATION:    GENERAL: No acute distress  CRANIUM: Normal cephalic/atraumatic  HEENT:        EYES: No acute fundic abnormalities.  Pupils equal round reactive to light.  EOMs intact without nystagmus and fields full to confrontation       EARS: Tympanic membranes normal and hears tuning fork bilaterally.       THROAT: No acute oropharynx abnormalities       NECK: No bruits/no lymphadenopathy  CHEST: No acute cardiopulmonary abnormalities by auscultation  ABDOMEN: Nondistended  EXTREMITIES: Pulses at the dorsalis pedis symmetric  NEURO: Patient alert and follows commands without difficulty  SPEECH: Normal    CRANIAL NERVES: Motor/sensory about the face normal and  symmetric    MOTOR STRENGTH: Motor strength upper and lower extremities normal  STATION AND GAIT: Gait generally normal with Romberg negative.  Patient does tandem walk with minimal difficulty  CEREBELLAR: Finger-nose and heel-to-shin normal  SENSORY: Patient has some decrease in pin vibration distal proximal in the lower extremities to ankles bilaterally otherwise normal pin and vibration throughout  REFLEXES: Some decrease in ankle jerks versus knee jerks and biceps bilaterally without clonus or Babinski    ASSESSMENT AND PLAN: Patient with memory difficulties.  Patient has slight findings of neuropathy in the lower extremities.  Patient did take her Namenda and get appropriate blood work.  I spent 25 minutes with the patient 15 minutes counseling.Patient's Body mass index is 40.25 kg/m². BMI is above normal parameters. Recommendations include: referral to primary care.      Jammie was seen today for memory loss, stroke and seizures.    Diagnoses and all orders for this visit:    Memory difficulty  -     CBC & Differential; Future  -     Comprehensive Metabolic Panel; Future    Other orders  -     memantine (NAMENDA) 5 MG tablet; TAKE 2 TABLETS BY MOUTH ONCE DAILY AT BEDTIME        Dictated utilizing Dragon voice recognition software

## 2020-12-11 ENCOUNTER — OFFICE VISIT (OUTPATIENT)
Dept: NEUROLOGY | Facility: CLINIC | Age: 55
End: 2020-12-11

## 2020-12-11 VITALS
RESPIRATION RATE: 18 BRPM | DIASTOLIC BLOOD PRESSURE: 80 MMHG | HEART RATE: 78 BPM | SYSTOLIC BLOOD PRESSURE: 140 MMHG | HEIGHT: 67 IN | BODY MASS INDEX: 40.65 KG/M2 | WEIGHT: 259 LBS

## 2020-12-11 DIAGNOSIS — R41.3 MEMORY DIFFICULTY: Primary | ICD-10-CM

## 2020-12-11 PROCEDURE — 99213 OFFICE O/P EST LOW 20 MIN: CPT | Performed by: PSYCHIATRY & NEUROLOGY

## 2020-12-11 RX ORDER — MEMANTINE HYDROCHLORIDE 5 MG/1
TABLET ORAL
Qty: 180 TABLET | Refills: 1 | Status: SHIPPED | OUTPATIENT
Start: 2020-12-11 | End: 2021-06-14 | Stop reason: SDUPTHER

## 2020-12-11 RX ORDER — LANOLIN ALCOHOL/MO/W.PET/CERES
1000 CREAM (GRAM) TOPICAL DAILY
COMMUNITY

## 2020-12-11 RX ORDER — FOLIC ACID 1 MG/1
1 TABLET ORAL DAILY
COMMUNITY

## 2020-12-11 NOTE — PROGRESS NOTES
Subjective   Jammie Mims, 1965, is a female who is being seen today for   Chief Complaint   Patient presents with   • Memory Loss   • Seizures   • Stroke       HISTORY OF PRESENT ILLNESS: According to the patient and patient's  patient's memory is stable on the Namenda 5 mg, 2 p.o. nightly.  Patient's recent blood work show increased creatinine and decreased GFR as well as increased triglycerides and white blood cell count.  These are being followed by PCP.  Patient is started on B12 and folate from lower levels    REVIEW OF SYSTEMS:   GENERAL: Blood pressure 138/80 left arm seated 140/80 left arm standing with pulse 78  PULMONARY: No acute respiratory difficulty  CVS: No acute chest pain or palpitation  GASTROINTESTINAL: No acute GI distress  GENITOURINARY: No acute  distress  GYN: No acute GYN distress  MUSCULOSKELETAL: No acute musculoskeletal symptoms  HEENT: No acute vision or hearing change  ENDOCRINE: No acute endocrine symptoms  PSYCHIATRIC: No acute psychiatric symptoms  HEMATOLOGY: As above  SKIN: No acute skin changes  Family history reviewed and otherwise noncontributory to this problem    Social history: Patient does smoke.  Patient denies alcohol or drug use    PHYSICAL EXAMINATION:    GENERAL: No acute distress/ patient says she has at times some sinus headaches  CRANIUM: Normal cephalic/atraumatic  HEENT:       EYES: No acute fundic abnormalities.  Pupils equal round reactive to light.  EOMs intact without nystagmus and fields full to confrontation.       EARS: Tympanic membranes normal and hears tuning fork bilaterally       THROAT: No acute oropharynx abnormalities       NECK: No bruits/no lymphadenopathy  CHEST: No acute cardiopulmonary abnormalities by auscultation  ABDOMEN: Nondistended  EXTREMITIES: Dorsalis pedis pulses symmetrical  NEURO: MMSE is 30 of 30  SPEECH: Normal  CRANIAL NERVES: Motor/sensory about the face normal and symmetric    MOTOR STRENGTH: Motor  strength upper and lower extremities normal  STATION AND GAIT: Gait normal/Romberg negative  CEREBELLAR: Finger-nose and heel-to-shin normal  SENSORY: Some decrease in pin vibration distal proximal lower extremities ankles bilaterally otherwise normal pin and vibration throughout  REFLEXES: Decreased ankle jerk versus knee jerk and biceps bilaterally without clonus or Babinski      ASSESSMENT AND PLAN: Memory difficulty as above stable on the Namenda and continue same with driving precautions and seizure precautions stroke precautions safety precautions.  I spent 15 minutes with this patient with 10 minutes counseling.Patient's Body mass index is 40.57 kg/m². BMI is above normal parameters. Recommendations include: referral to primary care.      Diagnoses and all orders for this visit:    1. Memory difficulty (Primary)    Other orders  -     memantine (NAMENDA) 5 MG tablet; TAKE 2 TABLETS BY MOUTH ONCE DAILY AT BEDTIME  Dispense: 180 tablet; Refill: 1        Dictated utilizing Dragon voice recognition software

## 2020-12-11 NOTE — PATIENT INSTRUCTIONS
Patient to get to emergency room immediately if stroke or seizure symptoms occur.  Patient get with PCP about weight and diet control.  Patient to continue driving and safety precautions as previously discussed

## 2021-06-21 ENCOUNTER — OFFICE VISIT (OUTPATIENT)
Dept: NEUROLOGY | Facility: CLINIC | Age: 56
End: 2021-06-21

## 2021-06-21 VITALS
OXYGEN SATURATION: 98 % | HEART RATE: 79 BPM | RESPIRATION RATE: 18 BRPM | BODY MASS INDEX: 40.49 KG/M2 | SYSTOLIC BLOOD PRESSURE: 142 MMHG | DIASTOLIC BLOOD PRESSURE: 90 MMHG | WEIGHT: 258 LBS | TEMPERATURE: 99.1 F | HEIGHT: 67 IN

## 2021-06-21 DIAGNOSIS — R41.3 MEMORY DIFFICULTY: Primary | ICD-10-CM

## 2021-06-21 PROBLEM — E78.5 HYPERLIPIDEMIA: Status: ACTIVE | Noted: 2019-08-19

## 2021-06-21 PROBLEM — F32.A DEPRESSIVE DISORDER: Status: ACTIVE | Noted: 2019-08-19

## 2021-06-21 PROBLEM — I10 HYPERTENSIVE DISORDER: Status: ACTIVE | Noted: 2019-08-19

## 2021-06-21 PROBLEM — Z86.010 HISTORY OF COLONIC POLYPS: Status: ACTIVE | Noted: 2019-08-19

## 2021-06-21 PROCEDURE — 99214 OFFICE O/P EST MOD 30 MIN: CPT | Performed by: PSYCHIATRY & NEUROLOGY

## 2021-06-21 RX ORDER — ERGOCALCIFEROL 1.25 MG/1
1 CAPSULE ORAL WEEKLY
COMMUNITY

## 2021-06-21 RX ORDER — MEMANTINE HYDROCHLORIDE 5 MG/1
TABLET ORAL
Qty: 180 TABLET | Refills: 1 | Status: SHIPPED | OUTPATIENT
Start: 2021-06-21 | End: 2021-12-14 | Stop reason: SDUPTHER

## 2021-06-21 NOTE — PROGRESS NOTES
Subjective   Jammie ARGUETA, 1965, is a female who is being seen today for   Chief Complaint   Patient presents with   • Memory Loss     6 month follow-up   • Seizures   • Cerebrovascular Accident       HISTORY OF PRESENT ILLNESS: Extended follow-up.  Patient seen for history of memory difficulties possible seizures and stroke.  Patient denies any stroke symptoms.  Patient is on a 325 mg aspirin and statin.  Patient also continues folic acid and B12.  Apparently has not been any blood work done in the last year and she is given scripts for CBC and CMP as per her family doctor.  Patient memory is stable.  MMSE today is 29/30.  We have kept Namenda low dosage because of her previous renal problems.  Patient was not able to tolerate Aricept    REVIEW OF SYSTEMS:   GENERAL: Blood pressure today 144/90 standing.  Patient sitting is 140/82 pulse 79 patient temperature 99.1  PULMONARY: No acute respiratory difficulty  CVS: No acute chest pain or palpitation  GASTROINTESTINAL: No acute GI distress  GENITOURINARY: No acute  distress  GYN: No acute GYN distress  MUSCULOSKELETAL: No acute musculoskeletal symptoms  HEENT: No acute vision or hearing change and patient denies any headaches  ENDOCRINE: Not diabetic  PSYCHIATRIC: No acute psychiatric symptoms  HEMATOLOGY: As above  SKIN: No acute skin changes  Family history reviewed and otherwise noncontributory except for history of stroke    Social history: Patient does smoke.  Patient denies alcohol or drug use  PHYSICAL EXAMINATION:    GENERAL: No acute distress  CRANIUM: Normal cephalic/atraumatic  HEENT:       EYES: No acute finding abnormalities.  Pupils equal round reactive to light.  EOMs intact without nystagmus and fields full to confrontation       EARS: Tympanic membranes normal and hears tuning fork bilaterally       THROAT: No acute oropharynx abnormalities       NECK: No bruits/no lymphadenopathy  CHEST: No acute cardiopulmonary abnormalities by  "auscultation  ABDOMEN: Nondistended  EXTREMITIES: Dorsalis pedis pulse symmetrical  NEURO: Patient alert and follows commands without difficulty  SPEECH: Normal    CRANIAL NERVES: Motor/sensory about the face normal and symmetric    MOTOR STRENGTH: Motor strength upper and lower extremities normal  STATION AND GAIT: Gait normal/Romberg negative  CEREBELLAR: Finger-nose and heel-to-shin normal  SENSORY: Slight decrease in pin vibration distal proximal lower extremities to mid foot bilaterally  REFLEXES: Reflexes are present and symmetric upper and lower extremities no clonus or Babinski      ASSESSMENT AND PLAN: Patient with memory difficulties and history of cerebrovascular insufficiency and seizure/syncope.  I spent 30 minutes with this patient with counseling and exam and review of records.  Patient to get with PCP about weight and diet control the elevated BMI.  Patient to continue safety and seizure stroke precautions as previously discussed.  Patient does drive \"a little bit\".      Diagnoses and all orders for this visit:    1. Memory difficulty (Primary)  -     CBC & Differential; Future  -     Comprehensive Metabolic Panel; Future    Other orders  -     memantine (NAMENDA) 5 MG tablet; TAKE 2 TABLETS BY MOUTH ONCE DAILY AT BEDTIME  Dispense: 180 tablet; Refill: 1        Dictated utilizing Dragon voice recognition software  "

## 2021-06-21 NOTE — PATIENT INSTRUCTIONS
Patient given PCP about weight and diet control.  Patient to have driving precautions and safety and seizure/stroke precautions as discussed.  Patient get to emergency room immediately if seizure occurs.  Patient not to be climbing or using sharp cutting pills in the caution work around hot fire/stove/grill/water.  Patient not to be swimming by self or getting hot tub by self and take showers not baths

## 2021-06-22 ENCOUNTER — TELEPHONE (OUTPATIENT)
Dept: NEUROLOGY | Facility: CLINIC | Age: 56
End: 2021-06-22

## 2021-06-22 NOTE — TELEPHONE ENCOUNTER
Provider: DESMOND  Caller: PT  Relationship to Patient: SELF  Pharmacy: N/A  Phone Number: [phone]  Reason for Call: PT TEL TO ADVISE SHE IS SCHEDULED FOR HER LABS ON 7/13/21.    DOES SHE NEED TO HAVE THEM DONE SOONER?    PLEASE CALL & ADVISE.    THANK YOU.

## 2021-06-22 NOTE — TELEPHONE ENCOUNTER
Linnette notified it's OK to have the lab done on 7-13, I sent Adelina a message to let her know.

## 2021-11-23 ENCOUNTER — TELEPHONE (OUTPATIENT)
Dept: NEUROLOGY | Facility: CLINIC | Age: 56
End: 2021-11-23

## 2021-12-14 ENCOUNTER — OFFICE VISIT (OUTPATIENT)
Dept: NEUROLOGY | Facility: CLINIC | Age: 56
End: 2021-12-14

## 2021-12-14 VITALS
SYSTOLIC BLOOD PRESSURE: 130 MMHG | BODY MASS INDEX: 38.45 KG/M2 | OXYGEN SATURATION: 99 % | HEART RATE: 60 BPM | DIASTOLIC BLOOD PRESSURE: 78 MMHG | HEIGHT: 67 IN | WEIGHT: 245 LBS

## 2021-12-14 DIAGNOSIS — R41.3 MEMORY DIFFICULTY: Primary | ICD-10-CM

## 2021-12-14 DIAGNOSIS — I67.9 CEREBROVASCULAR SMALL VESSEL DISEASE: ICD-10-CM

## 2021-12-14 DIAGNOSIS — Z86.73 H/O ISCHEMIC VERTEBROBASILAR ARTERY CEREBELLAR STROKE: ICD-10-CM

## 2021-12-14 PROCEDURE — 99214 OFFICE O/P EST MOD 30 MIN: CPT | Performed by: PHYSICIAN ASSISTANT

## 2021-12-14 RX ORDER — MEMANTINE HYDROCHLORIDE 5 MG/1
10 TABLET ORAL
Qty: 180 TABLET | Refills: 3 | Status: SHIPPED | OUTPATIENT
Start: 2021-12-14

## 2021-12-14 RX ORDER — METOPROLOL SUCCINATE 25 MG/1
25 TABLET, EXTENDED RELEASE ORAL DAILY
COMMUNITY
Start: 2021-12-13

## 2021-12-14 RX ORDER — HYDROCHLOROTHIAZIDE 25 MG/1
25 TABLET ORAL DAILY
COMMUNITY
Start: 2021-09-30

## 2021-12-14 NOTE — PROGRESS NOTES
Subjective   Jammie ARGUETA is a 56 y.o. female who presents today for follow-up of memory difficulty. 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 She is accompanied by her  today.     History of Present Illness     Memory issues  Per , she has been doing fairly well over the past several months. She still has some days where she has difficult with her memory but has been doing well overall. She is currently on Namenda twice a day and is tolerating it well. In the past, she has tried taking 3 a day, however she was not able to tolerate it. Her memory issues have been non-progressive. The memory issues started after her strokes.     history of CVA  She has suffered 2 strokes in the past noted on her MRI. She also has cerebrovascular small vessel disease seen on her MRI. She has trouble with ataxia on the right side. She has trouble climbing a ladder or even riding a bicycle. She is able to walk without difficulty.     The following portions of the patient's history were reviewed and updated as appropriate: allergies, current medications, past family history, past medical history, past social history, past surgical history and problem list.    Review of Systems:  A review of systems was performed, and positive findings are noted in the HPI.      Current Outpatient Medications:   •  amLODIPine (NORVASC) 10 MG tablet, Take 10 mg by mouth Daily., Disp: , Rfl:   •  aspirin 325 MG tablet, Take 325 mg by mouth Daily., Disp: , Rfl:   •  atorvastatin (LIPITOR) 40 MG tablet, Take 40 mg by mouth Daily., Disp: , Rfl:   •  citalopram (CeleXA) 20 MG tablet, Take 20 mg by mouth Daily., Disp: , Rfl:   •  enalapril (VASOTEC) 20 MG tablet, Take 20 mg by mouth 2 (Two) Times a Day., Disp: , Rfl:   •  folic acid (FOLVITE) 1 MG tablet, Take 1 mg by mouth Daily., Disp: , Rfl:   •  hydroCHLOROthiazide (HYDRODIURIL) 25 MG tablet, Take 25 mg by  mouth Daily., Disp: , Rfl:   •  memantine (NAMENDA) 5 MG tablet, Take 2 tablets by mouth every night at bedtime. TAKE 2 TABLETS BY MOUTH ONCE DAILY AT BEDTIME, Disp: 180 tablet, Rfl: 3  •  metoprolol succinate XL (TOPROL-XL) 25 MG 24 hr tablet, Take 25 mg by mouth Daily., Disp: , Rfl:   •  omeprazole (priLOSEC) 40 MG capsule, Take 40 mg by mouth Daily., Disp: , Rfl:   •  vitamin B-12 (CYANOCOBALAMIN) 1000 MCG tablet, Take 1,000 mcg by mouth Daily., Disp: , Rfl:   •  vitamin D (ERGOCALCIFEROL) 1.25 MG (73537 UT) capsule capsule, 1 capsule 1 (One) Time Per Week., Disp: , Rfl:      Objective   Physical Exam  Vitals and nursing note reviewed.   HENT:      Head: Normocephalic.      Right Ear: Hearing and external ear normal.      Left Ear: Hearing and external ear normal.      Nose: Nose normal.      Mouth/Throat:      Pharynx: Oropharynx is clear.   Eyes:      General: Lids are normal. Vision grossly intact. Gaze aligned appropriately. No scleral icterus.     Extraocular Movements: Extraocular movements intact.      Conjunctiva/sclera: Conjunctivae normal.      Pupils: Pupils are equal, round, and reactive to light.      Visual Fields: Right eye visual fields normal and left eye visual fields normal.   Neck:      Vascular: No carotid bruit or JVD.      Trachea: Trachea and phonation normal.   Cardiovascular:      Rate and Rhythm: Normal rate.      Heart sounds: Normal heart sounds.   Pulmonary:      Effort: Pulmonary effort is normal.      Breath sounds: Normal breath sounds.   Musculoskeletal:      Cervical back: Normal range of motion.   Skin:     General: Skin is warm and dry.   Neurological:      Mental Status: She is alert and oriented to person, place, and time.      GCS: GCS eye subscore is 4. GCS verbal subscore is 5. GCS motor subscore is 6.      Cranial Nerves: Cranial nerves are intact.      Sensory: Sensation is intact.      Motor: Motor function is intact.      Coordination: Coordination is intact.       Gait: Gait is intact.      Deep Tendon Reflexes: Reflexes are normal and symmetric.   Psychiatric:         Attention and Perception: Attention and perception normal.         Mood and Affect: Mood and affect normal.         Speech: Speech normal.         Behavior: Behavior normal.         Thought Content: Thought content normal.         Cognition and Memory: Cognition and memory normal.         Judgment: Judgment normal.       EEG (06/27/2018 12:59)   MRI Brain Without Contrast (06/28/2018 14:42)   US Carotid Bilateral (06/28/2018 15:38)   MRI Angiogram Head Without Contrast (07/26/2018 10:25)       Assessment/Plan   Diagnoses and all orders for this visit:    1. Memory difficulty (Primary)  - She is stable. She continues on Namenda 2 tablets a day and tolerates it well. No changes made today. Will refill the Namenda  -     memantine (NAMENDA) 5 MG tablet; Take 2 tablets by mouth every night at bedtime. TAKE 2 TABLETS BY MOUTH ONCE DAILY AT BEDTIME  Dispense: 180 tablet; Refill: 3    2. H/O ischemic vertebrobasilar artery cerebellar stroke  - She is stable with some residual right sided ataxia.     3. Cerebrovascular small vessel disease  - This is stable on recent MRI.     Follow up in 6 months.         Transcribed from ambient dictation for KATHIA Felix by Cee MEJIAS Rep.  12/14/21   16:12 CST    Patient verbalized consent to the visit recording.  I have personally performed the services described in this document as transcribed by the above individual, and it is both accurate and complete.  KATHIA Felix  12/14/2021  16:33 CST

## 2022-06-14 ENCOUNTER — OFFICE VISIT (OUTPATIENT)
Dept: NEUROLOGY | Facility: CLINIC | Age: 57
End: 2022-06-14

## 2022-06-14 VITALS
HEIGHT: 67 IN | SYSTOLIC BLOOD PRESSURE: 150 MMHG | DIASTOLIC BLOOD PRESSURE: 80 MMHG | HEART RATE: 64 BPM | BODY MASS INDEX: 38.45 KG/M2 | OXYGEN SATURATION: 99 % | WEIGHT: 245 LBS

## 2022-06-14 DIAGNOSIS — G11.9 CEREBELLAR HEMIATAXIA: Primary | ICD-10-CM

## 2022-06-14 DIAGNOSIS — R41.3 MEMORY DIFFICULTY: ICD-10-CM

## 2022-06-14 DIAGNOSIS — I67.9 CEREBROVASCULAR SMALL VESSEL DISEASE: ICD-10-CM

## 2022-06-14 DIAGNOSIS — Z86.73 H/O ISCHEMIC VERTEBROBASILAR ARTERY CEREBELLAR STROKE: ICD-10-CM

## 2022-06-14 PROCEDURE — 99213 OFFICE O/P EST LOW 20 MIN: CPT | Performed by: PHYSICIAN ASSISTANT

## 2022-06-14 NOTE — PROGRESS NOTES
Subjective   Jammie ARGUETA is a 56 y.o. female who presents for follow-up of cerebellar ataxia with a history of vertebral artery cerebellar stroke, memory difficulty with a history of small vessel cerebrovascular disease. The patient is accompanied by her  today. Overall, her situation has been stable.      History of Present Illness   Memory  The patient states her memory has remained stable since her last visit. Occasionally, she has difficulty maintaining her balance, and notes having more difficulty with her right foot. She occasionally has slurred speech. Jammie denies difficulty with swallowing. She states her strength is good. Her primary care provider is MEG Astorga and she has an appointment with her on 06/15/2022 for a health maintenance follow-up.     The following portions of the patient's history were reviewed and updated as appropriate: allergies, current medications, past family history, past medical history, past social history, past surgical history and problem list.    Review of Systems:  A review of systems was performed, and positive findings are noted in the HPI.      Current Outpatient Medications:   •  amLODIPine (NORVASC) 10 MG tablet, Take 10 mg by mouth Daily., Disp: , Rfl:   •  aspirin 325 MG tablet, Take 325 mg by mouth Daily., Disp: , Rfl:   •  atorvastatin (LIPITOR) 40 MG tablet, Take 40 mg by mouth Daily., Disp: , Rfl:   •  citalopram (CeleXA) 20 MG tablet, Take 20 mg by mouth Daily., Disp: , Rfl:   •  enalapril (VASOTEC) 20 MG tablet, Take 20 mg by mouth 2 (Two) Times a Day., Disp: , Rfl:   •  folic acid (FOLVITE) 1 MG tablet, Take 1 mg by mouth Daily., Disp: , Rfl:   •  hydroCHLOROthiazide (HYDRODIURIL) 25 MG tablet, Take 25 mg by mouth Daily., Disp: , Rfl:   •  memantine (NAMENDA) 5 MG tablet, Take 2 tablets by mouth every night at bedtime. TAKE 2 TABLETS BY MOUTH ONCE DAILY AT BEDTIME, Disp: 180 tablet, Rfl: 3  •  metoprolol succinate XL (TOPROL-XL) 25  MG 24 hr tablet, Take 25 mg by mouth Daily., Disp: , Rfl:   •  omeprazole (priLOSEC) 40 MG capsule, Take 40 mg by mouth Daily., Disp: , Rfl:   •  vitamin B-12 (CYANOCOBALAMIN) 1000 MCG tablet, Take 1,000 mcg by mouth Daily., Disp: , Rfl:   •  vitamin D (ERGOCALCIFEROL) 1.25 MG (80067 UT) capsule capsule, 1 capsule 1 (One) Time Per Week., Disp: , Rfl:      Objective   Physical Exam  Vitals and nursing note reviewed.   HENT:      Head: Normocephalic.      Right Ear: Hearing and external ear normal.      Left Ear: Hearing and external ear normal.      Nose: Nose normal.      Mouth/Throat:      Pharynx: Oropharynx is clear.   Eyes:      General: Lids are normal. Vision grossly intact. Gaze aligned appropriately. No scleral icterus.     Extraocular Movements: Extraocular movements intact.      Conjunctiva/sclera: Conjunctivae normal.      Pupils: Pupils are equal, round, and reactive to light.      Visual Fields: Right eye visual fields normal and left eye visual fields normal.   Neck:      Vascular: No carotid bruit or JVD.      Trachea: Trachea and phonation normal.   Cardiovascular:      Rate and Rhythm: Normal rate.      Heart sounds: Normal heart sounds.   Pulmonary:      Effort: Pulmonary effort is normal.      Breath sounds: Normal breath sounds.   Musculoskeletal:      Cervical back: Normal range of motion.   Skin:     General: Skin is warm and dry.   Neurological:      Mental Status: She is alert and oriented to person, place, and time.      GCS: GCS eye subscore is 4. GCS verbal subscore is 5. GCS motor subscore is 6.      Cranial Nerves: Cranial nerves are intact.      Sensory: Sensation is intact.      Motor: Motor function is intact.      Coordination: Coordination is intact.      Gait: Gait is intact.      Deep Tendon Reflexes: Reflexes are normal and symmetric.   Psychiatric:         Attention and Perception: Attention and perception normal.         Mood and Affect: Mood and affect normal.         Speech:  Speech normal.         Behavior: Behavior normal.         Thought Content: Thought content normal.         Cognition and Memory: Cognition and memory normal.         Judgment: Judgment normal.           Assessment & Plan   Diagnoses and all orders for this visit:    1. Cerebellar hemiataxia (HCC) (Primary)    2. H/O ischemic vertebrobasilar artery cerebellar stroke    3. Memory difficulty    4. Cerebrovascular small vessel disease      1. 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    2. Memory issues  - Likely related to small vessel cerebrovascular disease. Continue Namenda 5 mg twice per day. Also discussed with the patient follow up with her family doctor tomorrow and we will be happy to review any data gathered at that point, in particular LDL levels, which should be at or below 70. Ideally, she will return in 1 year for follow-up.               Transcribed from ambient dictation for KATHIA Felix by FRANTZ ESTEVEZ.  06/14/22   12:05 CDT    Patient verbalized consent to the visit recording.

## 2023-06-13 ENCOUNTER — OFFICE VISIT (OUTPATIENT)
Dept: NEUROLOGY | Facility: CLINIC | Age: 58
End: 2023-06-13
Payer: MEDICARE

## 2023-06-13 VITALS
OXYGEN SATURATION: 99 % | DIASTOLIC BLOOD PRESSURE: 82 MMHG | SYSTOLIC BLOOD PRESSURE: 134 MMHG | BODY MASS INDEX: 36.88 KG/M2 | HEART RATE: 78 BPM | HEIGHT: 67 IN | WEIGHT: 235 LBS

## 2023-06-13 DIAGNOSIS — I67.9 CEREBROVASCULAR SMALL VESSEL DISEASE: ICD-10-CM

## 2023-06-13 DIAGNOSIS — Z86.73 H/O ISCHEMIC VERTEBROBASILAR ARTERY CEREBELLAR STROKE: Primary | ICD-10-CM

## 2023-06-13 DIAGNOSIS — R41.3 MEMORY DIFFICULTY: ICD-10-CM

## 2023-06-13 DIAGNOSIS — G11.9 CEREBELLAR HEMIATAXIA: ICD-10-CM

## 2023-06-13 RX ORDER — CYCLOBENZAPRINE HCL 5 MG
5 TABLET ORAL 3 TIMES DAILY PRN
COMMUNITY
Start: 2023-04-18

## 2023-06-13 NOTE — PROGRESS NOTES
Subjective   Jammie ARGUETA is a 57 y.o. female who returns in follow-up for history of cerebellar strokes bilaterally with ongoing issues with mild memory disturbance and ataxia. The patient is accompanied by her  today.    History of Present Illness     Cerebellar strokes bilaterally  The patient reports that she feels good. She states that she has not noticed much difference in her walking and balance. The patient reports that she gets a little tired, especially on the right side. She states that she likes to work out in the yard and do stuff outside. The patient reports that she is able to do what she wants to do. She states that her memory problems are about the same. The patient reports that she can not remember people will come up. She states that there is some difficulty, but it is more difficult. The patient reports that she does not feel that it is progressive. She states that it seems like it has just stalled. The patient reports that when she gets super tired, her memory is worse. She states that she is having a hard time bouncing back real quick. The patient reports that it takes her a little while for everything to get to go on again. She states that her legs start and they feel like they wear her out. The patient reports that it takes a little bit to get straight back up. She states that she is doing well with her family doctor. The patient reports that she is still taking her aspirin every day.    The following portions of the patient's history were reviewed and updated as appropriate: allergies, current medications, past family history, past medical history, past social history, past surgical history and problem list.    Review of Systems:  A review of systems was performed, and positive findings are noted in the HPI.      Current Outpatient Medications:     amLODIPine (NORVASC) 10 MG tablet, Take 1 tablet by mouth Daily., Disp: , Rfl:     aspirin 325 MG tablet, Take 1 tablet by  mouth Daily., Disp: , Rfl:     atorvastatin (LIPITOR) 40 MG tablet, Take 1 tablet by mouth Daily., Disp: , Rfl:     citalopram (CeleXA) 20 MG tablet, Take 1 tablet by mouth Daily., Disp: , Rfl:     cyclobenzaprine (FLEXERIL) 5 MG tablet, Take 1 tablet by mouth 3 (Three) Times a Day As Needed., Disp: , Rfl:     enalapril (VASOTEC) 20 MG tablet, Take 1 tablet by mouth 2 (Two) Times a Day., Disp: , Rfl:     folic acid (FOLVITE) 1 MG tablet, Take 1 tablet by mouth Daily., Disp: , Rfl:     hydroCHLOROthiazide (HYDRODIURIL) 25 MG tablet, Take 1 tablet by mouth Daily., Disp: , Rfl:     memantine (NAMENDA) 5 MG tablet, Take 2 tablets by mouth every night at bedtime. TAKE 2 TABLETS BY MOUTH ONCE DAILY AT BEDTIME, Disp: 180 tablet, Rfl: 3    metoprolol succinate XL (TOPROL-XL) 25 MG 24 hr tablet, Take 1 tablet by mouth Daily., Disp: , Rfl:     omeprazole (priLOSEC) 40 MG capsule, Take 1 capsule by mouth Daily., Disp: , Rfl:     vitamin B-12 (CYANOCOBALAMIN) 1000 MCG tablet, Take 1 tablet by mouth Daily., Disp: , Rfl:     vitamin D (ERGOCALCIFEROL) 1.25 MG (03505 UT) capsule capsule, 1 capsule 1 (One) Time Per Week., Disp: , Rfl:      Objective   Physical Exam  Vitals and nursing note reviewed.   HENT:      Right Ear: Hearing normal.      Left Ear: Hearing normal.   Eyes:      General: Lids are normal. Vision grossly intact. Gaze aligned appropriately. No scleral icterus.     Extraocular Movements: Extraocular movements intact.      Conjunctiva/sclera: Conjunctivae normal.   Neck:      Vascular: No JVD.      Trachea: Trachea and phonation normal.   Cardiovascular:      Rate and Rhythm: Normal rate.   Pulmonary:      Effort: Pulmonary effort is normal.   Skin:     General: Skin is warm and dry.   Neurological:      Mental Status: She is alert and oriented to person, place, and time.      GCS: GCS eye subscore is 4. GCS verbal subscore is 5. GCS motor subscore is 6.      Sensory: Sensation is intact.      Motor: Motor function  is intact.      Coordination: Coordination is intact.      Gait: Gait is intact.   Psychiatric:         Attention and Perception: Attention and perception normal.         Mood and Affect: Mood and affect normal.         Speech: Speech normal.         Behavior: Behavior normal.         Thought Content: Thought content normal.         Cognition and Memory: Cognition and memory normal.         Judgment: Judgment normal.      Comments: MCI subjectively         Assessment & Plan   Diagnoses and all orders for this visit:    1. H/O ischemic vertebrobasilar artery cerebellar stroke (Primary)    2. Cerebrovascular small vessel disease    3. Memory difficulty    4. Cerebellar hemiataxia      - She is stable on aspirin and Lipitor at this time. She continues with Namenda and feels as though it is helpful at this point. From a neurology standpoint, given the distance from our practice, I think that she can safely follow up on an as needed basis and she was asked to contact us if there are any problems. She and her  both assured me that they would do so and we are happy with this outcome.               Transcribed from ambient dictation for KATHIA Felix by Azra Fitzpatrick.  06/13/23   10:50 CDT    Patient or patient representative verbalized consent to the visit recording.  I have personally performed the services described in this document as transcribed by the above individual, and it is both accurate and complete.